# Patient Record
Sex: MALE | Race: BLACK OR AFRICAN AMERICAN | NOT HISPANIC OR LATINO | Employment: FULL TIME | URBAN - METROPOLITAN AREA
[De-identification: names, ages, dates, MRNs, and addresses within clinical notes are randomized per-mention and may not be internally consistent; named-entity substitution may affect disease eponyms.]

---

## 2017-03-10 ENCOUNTER — HOSPITAL ENCOUNTER (EMERGENCY)
Facility: HOSPITAL | Age: 27
Discharge: HOME | End: 2017-03-10
Attending: EMERGENCY MEDICINE

## 2017-03-10 VITALS
HEART RATE: 78 BPM | BODY MASS INDEX: 26.11 KG/M2 | TEMPERATURE: 98 F | HEIGHT: 75 IN | RESPIRATION RATE: 16 BRPM | DIASTOLIC BLOOD PRESSURE: 77 MMHG | WEIGHT: 210 LBS | OXYGEN SATURATION: 99 % | SYSTOLIC BLOOD PRESSURE: 136 MMHG

## 2017-03-10 DIAGNOSIS — Z20.2 EXPOSURE TO CHLAMYDIA: Primary | ICD-10-CM

## 2017-03-10 LAB
BILIRUB UR QL STRIP: NEGATIVE
CLARITY UR: CLEAR
COLOR UR: YELLOW
GLUCOSE UR QL STRIP: NEGATIVE
HGB UR QL STRIP: NEGATIVE
KETONES UR QL STRIP: NEGATIVE
LEUKOCYTE ESTERASE UR QL STRIP: NEGATIVE
NITRITE UR QL STRIP: NEGATIVE
PH UR STRIP: 6 [PH] (ref 5–8)
PROT UR QL STRIP: NEGATIVE
SP GR UR STRIP: 1.02 (ref 1–1.03)
URN SPEC COLLECT METH UR: NORMAL
UROBILINOGEN UR STRIP-ACNC: 1 EU/DL

## 2017-03-10 PROCEDURE — 81003 URINALYSIS AUTO W/O SCOPE: CPT

## 2017-03-10 PROCEDURE — 25000003 PHARM REV CODE 250: Performed by: EMERGENCY MEDICINE

## 2017-03-10 PROCEDURE — 96372 THER/PROPH/DIAG INJ SC/IM: CPT

## 2017-03-10 PROCEDURE — 63600175 PHARM REV CODE 636 W HCPCS: Performed by: EMERGENCY MEDICINE

## 2017-03-10 PROCEDURE — 99283 EMERGENCY DEPT VISIT LOW MDM: CPT | Mod: 25

## 2017-03-10 RX ORDER — ONDANSETRON 4 MG/1
8 TABLET, ORALLY DISINTEGRATING ORAL
Status: COMPLETED | OUTPATIENT
Start: 2017-03-10 | End: 2017-03-10

## 2017-03-10 RX ORDER — AZITHROMYCIN 250 MG/1
1000 TABLET, FILM COATED ORAL
Status: COMPLETED | OUTPATIENT
Start: 2017-03-10 | End: 2017-03-10

## 2017-03-10 RX ORDER — CEFTRIAXONE 250 MG/1
250 INJECTION, POWDER, FOR SOLUTION INTRAMUSCULAR; INTRAVENOUS
Status: COMPLETED | OUTPATIENT
Start: 2017-03-10 | End: 2017-03-10

## 2017-03-10 RX ADMIN — AZITHROMYCIN 1000 MG: 250 TABLET, FILM COATED ORAL at 02:03

## 2017-03-10 RX ADMIN — CEFTRIAXONE SODIUM 250 MG: 250 INJECTION, POWDER, FOR SOLUTION INTRAMUSCULAR; INTRAVENOUS at 02:03

## 2017-03-10 RX ADMIN — ONDANSETRON 8 MG: 4 TABLET, ORALLY DISINTEGRATING ORAL at 02:03

## 2017-03-10 NOTE — DISCHARGE INSTRUCTIONS
Follow up at    [facility]  [address] [address]  [phone]

## 2017-03-10 NOTE — ED TRIAGE NOTES
Pt. Reports his girlfriend was dx. With std. He denies any penile discharge but mild dysuria he noticed a couple of days ago. Denies any other symptoms. Pt. Exam is otherwise wnl..

## 2017-03-10 NOTE — ED AVS SNAPSHOT
[facility]  [address]  [place] [address]               Yazan Taylor   3/10/2017  2:22 AM   ED    Description:  Male : 1990   Department:  [facility]           Your Care was Coordinated By:     Provider Role From To    Naren Pablo MD Attending Provider 03/10/17 0231 --      Reason for Visit     Exposure to STD           Diagnoses this Visit        Comments    Exposure to chlamydia    -  Primary       ED Disposition     ED Disposition Condition Comment    Discharge             To Do List           Follow-up Information     Follow up with [facility].    Specialties:  Internal Medicine, Family Medicine    Contact information:    [address]  [place] [address]  [phone]        [facility]     [facility] -  Assistance  Registered nurses in the [facility] provide clinical advisement, health education, appointment booking, and other advisory services.  Call for this free service at [phone].             Medications           Message regarding Medications     Verify the changes and/or additions to your medication regime listed below are the same as discussed with your clinician today.  If any of these changes or additions are incorrect, please notify your healthcare provider.        These medications were administered today        Dose Freq    cefTRIAXone injection 250 mg 250 mg ED 1 Time    Sig: Inject 250 mg into the muscle ED 1 Time.    Class: Normal    Route: Intramuscular    azithromycin tablet 1,000 mg 1,000 mg ED 1 Time    Sig: Take 4 tablets (1,000 mg total) by mouth ED 1 Time.    Class: Normal    Route: Oral    ondansetron disintegrating tablet 8 mg 8 mg ED 1 Time    Sig: Take 2 tablets (8 mg total) by mouth ED 1 Time.    Class: Normal    Route: Oral           Verify that the below list of medications is an accurate representation of the medications  "you are currently taking.  If none reported, the list may be blank. If incorrect, please contact your healthcare provider. Carry this list with you in case of emergency.           Current Medications     azithromycin tablet 1,000 mg Take 4 tablets (1,000 mg total) by mouth ED 1 Time.    cefTRIAXone injection 250 mg Inject 250 mg into the muscle ED 1 Time.    ondansetron disintegrating tablet 8 mg Take 2 tablets (8 mg total) by mouth ED 1 Time.           Clinical Reference Information           Your Vitals Were     BP Pulse Temp Resp Height Weight    136/77 (BP Location: Right arm, Patient Position: Sitting) 78 98 °F (36.7 °C) (Oral) 16 6' 3" (1.905 m) 95.3 kg (210 lb)    SpO2 BMI             99% 26.25 kg/m2         Allergies as of 3/10/2017     No Known Allergies      Immunizations Administered on Date of Encounter - 3/10/2017     None      ED Micro, Lab, POCT     Start Ordered       Status Ordering Provider    03/10/17 0222 03/10/17 0221  Urinalysis  STAT      Final result       ED Imaging Orders     None        Discharge Instructions       Follow up at    [facility]  [facility]  [address] [place] [id number]  [phone]    Discharge References/Attachments     CHLAMYDIA, (ENGLISH)    CHLAMYDIA (MALE) (ENGLISH)      [facility] Sign-Up     Activating your [facility] account is as easy as 1-2-3!     1) Visit [email or web address], select Sign Up Now, enter this activation code and your date of birth, then select Next.  [email or web address]  Expires: 4/24/2017  2:44 AM      2) Create a username and password to use when you visit [facility] in the future and select a security question in case you lose your password and select Next.    3) Enter your e-mail address and click Sign Up!    Additional Information  If you have questions, please e-mail [email or web address] or call [phone] to talk to our [facility] staff. Remember, [facility] is NOT to be used for urgent needs. For " medical emergencies, dial 911.          [facility] complies with applicable Federal civil rights laws and does not discriminate on the basis of race, color, national origin, age, disability, or sex.        Language Assistance Services     ATTENTION: Language assistance services are available, free of charge. Please call [phone].      ATENCIÓN: Si habla español, tiene a hendrickson disposición servicios gratuitos de asistencia lingüística. Llame al [phone].     CHÚ Ý: N?u b?n nói Ti?ng Vi?t, có các d?ch v? h? tr? ngôn ng? mi?n phí dành cho b?n. G?i s? [phone].

## 2017-03-30 NOTE — ED PROVIDER NOTES
Encounter Date: 3/10/2017       History     Chief Complaint   Patient presents with    Exposure to STD     Patient presents to the ED with complaints of having been exposed to chlamydia by a friend and was told to get a check up. Denies any symptoms at this time.      Review of patient's allergies indicates:  No Known Allergies  HPI Comments: CHIEF COMPLAINT: previous partner has chlamydia  HISTORY OF PRESENT ILLNESS: This is a 27 yo male who presents to the ED tonight after a previous partner told him that they had chlamydia. The patient himself has no complaints. No penile discharge. No lesions. No pain. No change in urination.    REVIEW OF SYSTEMS:  Constitutional: No fever, no chills.  Eyes: No discharge. No pain.  HENT: No nasal drainage. No ear ache. No sore throat.  Cardiovascular: No chest pain, no palpitations.  Respiratory: No cough, no shortness of breath.  Gastrointestinal: No abdominal pain, no vomiting. No diarrhea.  Genitourinary: No hematuria, dysuria, urgency.  Musculoskeletal: No back pain.  Skin: No rashes, no lesions.  Neurological: No headache, no focal weakness.        The history is provided by the patient.     History reviewed. No pertinent past medical history.  History reviewed. No pertinent surgical history.  History reviewed. No pertinent family history.  Social History   Substance Use Topics    Smoking status: Current Some Day Smoker    Smokeless tobacco: Never Used    Alcohol use No     Review of Systems   All other systems reviewed and are negative.      Physical Exam   Initial Vitals   BP Pulse Resp Temp SpO2   03/10/17 0219 03/10/17 0219 03/10/17 0219 03/10/17 0219 03/10/17 0219   136/77 78 16 98 °F (36.7 °C) 99 %     Physical Exam    Nursing note and vitals reviewed.  Constitutional: He appears well-developed and well-nourished. He is not diaphoretic. No distress.   HENT:   Head: Normocephalic and atraumatic.   Nose: Nose normal.   Mouth/Throat: Oropharynx is clear and moist.    Eyes: Conjunctivae and EOM are normal. Pupils are equal, round, and reactive to light. No scleral icterus.   Neck: Normal range of motion. Neck supple.   Cardiovascular: Normal rate, regular rhythm and normal heart sounds.   No murmur heard.  Pulmonary/Chest: Breath sounds normal. No stridor. No respiratory distress. He has no wheezes.   Abdominal: Soft. Bowel sounds are normal. He exhibits no distension and no mass. There is no tenderness. There is no rebound and no guarding.   Musculoskeletal: Normal range of motion. He exhibits no tenderness.   Neurological: He is alert and oriented to person, place, and time. He has normal strength. No cranial nerve deficit.   Skin: Skin is warm and dry. No rash noted. No pallor.   Psychiatric: He has a normal mood and affect. Thought content normal.         ED Course   Procedures  Labs Reviewed   URINALYSIS             Medical Decision Making:   Differential Diagnosis:   STD, prostatitis, epididymitis, UTI   ED Management:  The pt presents after being exposed to an STD. I will treat him for GC/Chlamydia. Encouraged partner treatment, sexual abstinence during treatment phase and repeat test of cure through PCP or STD clinic.  After taking into careful account the historical factors and physical exam findings of the patient's presentation today, in conjunction with the empirical and objective data obtained on ED workup, no acute emergent medical condition has been identified. The patient appears to be low risk for an emergent medical condition and I feel it is safe and appropriate at this time for the patient to be discharged to follow-up as detailed in their discharge instructions for reevaluation and possible continued outpatient workup and management. I have discussed the specifics of the workup with the patient and the patient has verbalized understanding of the details of the workup, the diagnosis, the treatment plan, and the need for outpatient follow-up. In addition, I  have advised the patient that they can return to the ED and/or activate EMS at any time with worsening of their symptoms, change of their symptoms, or with any other medical complaint. The patient remained comfortable and stable during their visit in the ED.  Discharge and follow-up instructions discussed with the patient who expressed understanding and willingness to comply with my recommendations.     This medical record was prepared using voice dictation software. There may be phonetic errors.                       ED Course     Clinical Impression:   The encounter diagnosis was Exposure to chlamydia.          Naren Pablo MD  03/30/17 0035

## 2019-05-07 ENCOUNTER — HOSPITAL ENCOUNTER (EMERGENCY)
Facility: HOSPITAL | Age: 29
Discharge: HOME | End: 2019-05-07
Attending: EMERGENCY MEDICINE
Payer: COMMERCIAL

## 2019-05-07 VITALS
WEIGHT: 220 LBS | BODY MASS INDEX: 27.35 KG/M2 | RESPIRATION RATE: 14 BRPM | TEMPERATURE: 98 F | HEART RATE: 70 BPM | OXYGEN SATURATION: 100 % | SYSTOLIC BLOOD PRESSURE: 137 MMHG | DIASTOLIC BLOOD PRESSURE: 81 MMHG | HEIGHT: 75 IN

## 2019-05-07 DIAGNOSIS — M89.8X6 PAIN IN LEFT TIBIA: ICD-10-CM
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
## 2019-05-07 DIAGNOSIS — S81.811A LACERATION OF RIGHT LOWER LEG, INITIAL ENCOUNTER: Primary | ICD-10-CM

## 2019-05-07 PROCEDURE — 12002 RPR S/N/AX/GEN/TRNK2.6-7.5CM: CPT | Mod: RT,,, | Performed by: EMERGENCY MEDICINE

## 2019-05-07 PROCEDURE — 99284 EMERGENCY DEPT VISIT MOD MDM: CPT | Mod: 25

## 2019-05-07 PROCEDURE — 99284 PR EMERGENCY DEPT VISIT,LEVEL IV: ICD-10-PCS | Mod: 25,,, | Performed by: EMERGENCY MEDICINE

## 2019-05-07 PROCEDURE — 90715 TDAP VACCINE 7 YRS/> IM: CPT | Performed by: EMERGENCY MEDICINE

## 2019-05-07 PROCEDURE — 12001 RPR S/N/AX/GEN/TRNK 2.5CM/<: CPT | Mod: RT

## 2019-05-07 PROCEDURE — 25000003 PHARM REV CODE 250: Performed by: EMERGENCY MEDICINE

## 2019-05-07 PROCEDURE — 99284 EMERGENCY DEPT VISIT MOD MDM: CPT | Mod: 25,,, | Performed by: EMERGENCY MEDICINE

## 2019-05-07 PROCEDURE — 63600175 PHARM REV CODE 636 W HCPCS: Performed by: EMERGENCY MEDICINE

## 2019-05-07 PROCEDURE — 90471 IMMUNIZATION ADMIN: CPT | Performed by: EMERGENCY MEDICINE

## 2019-05-07 PROCEDURE — 12002 PR RESUP NPTERF WND BODY 2.6-7.5 CM: ICD-10-PCS | Mod: RT,,, | Performed by: EMERGENCY MEDICINE

## 2019-05-07 RX ORDER — LIDOCAINE HYDROCHLORIDE 10 MG/ML
5 INJECTION, SOLUTION EPIDURAL; INFILTRATION; INTRACAUDAL; PERINEURAL
Status: COMPLETED | OUTPATIENT
Start: 2019-05-07 | End: 2019-05-07

## 2019-05-07 RX ORDER — CEPHALEXIN 500 MG/1
500 CAPSULE ORAL EVERY 12 HOURS
Qty: 6 CAPSULE | Refills: 0 | Status: SHIPPED | OUTPATIENT
Start: 2019-05-07 | End: 2019-05-10

## 2019-05-07 RX ORDER — CEPHALEXIN 500 MG/1
500 CAPSULE ORAL
Status: COMPLETED | OUTPATIENT
Start: 2019-05-07 | End: 2019-05-07

## 2019-05-07 RX ORDER — OXYCODONE AND ACETAMINOPHEN 5; 325 MG/1; MG/1
2 TABLET ORAL
Status: COMPLETED | OUTPATIENT
Start: 2019-05-07 | End: 2019-05-07

## 2019-05-07 RX ADMIN — BACITRACIN ZINC AND POLYMYXIN B SULFATE: at 08:05

## 2019-05-07 RX ADMIN — OXYCODONE HYDROCHLORIDE AND ACETAMINOPHEN 2 TABLET: 5; 325 TABLET ORAL at 06:05

## 2019-05-07 RX ADMIN — CLOSTRIDIUM TETANI TOXOID ANTIGEN (FORMALDEHYDE INACTIVATED), CORYNEBACTERIUM DIPHTHERIAE TOXOID ANTIGEN (FORMALDEHYDE INACTIVATED), BORDETELLA PERTUSSIS TOXOID ANTIGEN (GLUTARALDEHYDE INACTIVATED), BORDETELLA PERTUSSIS FILAMENTOUS HEMAGGLUTININ ANTIGEN (FORMALDEHYDE INACTIVATED), BORDETELLA PERTUSSIS PERTACTIN ANTIGEN, AND BORDETELLA PERTUSSIS FIMBRIAE 2/3 ANTIGEN 0.5 ML: 5; 2; 2.5; 5; 3; 5 INJECTION, SUSPENSION INTRAMUSCULAR at 06:05

## 2019-05-07 RX ADMIN — CEPHALEXIN 500 MG: 500 CAPSULE ORAL at 07:05

## 2019-05-07 RX ADMIN — LIDOCAINE HYDROCHLORIDE 50 MG: 10 INJECTION, SOLUTION EPIDURAL; INFILTRATION; INTRACAUDAL; PERINEURAL at 07:05

## 2019-05-07 NOTE — ED PROVIDER NOTES
Encounter Date: 5/7/2019    SCRIBE #1 NOTE: I, Elba Woo, am scribing for, and in the presence of,  Dr. Ramirez. I have scribed the entire note.       History     Chief Complaint   Patient presents with    Leg Injury     1 hour PTA reports he was getting his son's sports balls down from roof and when he was climbing down off the roof, he felt something hit his R leg and felt sharp pain. Puncture wound noted to inside of R calf, bleeding controlled at this time     Time patient was seen by the provider: 6:10 AM      The patient is a 28 y.o. male with no significant past medical history, who presents to the ED with a complaint of right leg pain s/p fall 1.5 hours PTA. The patient states he was getting his son's sports ball from the roof, he then jumped down landing on his right leg. He states when he fell down he felt something sharp puncturing his right leg. He endorses right leg pain and a punture wound to the RLE with abrasions. Unknown last tetanus shot. Denies back pain, neck pain, abdominal pain, chest pain, headache, other extremity pain.    The history is provided by the patient and medical records.     Review of patient's allergies indicates:  No Known Allergies  History reviewed. No pertinent past medical history.  History reviewed. No pertinent surgical history.  History reviewed. No pertinent family history.  Social History     Tobacco Use    Smoking status: Current Some Day Smoker    Smokeless tobacco: Never Used   Substance Use Topics    Alcohol use: No    Drug use: No     Review of Systems  Constitutional:  No Fever  Eyes: No Vision Changes  ENT/Mouth: No sore throat,  Cardiovascular:  No Chest Pain  Respiratory:  No Cough, No SOB  Gastrointestinal:  No Nausea, No Vomiting, No Diarrhea, No abdo pain.  Genitourinary:  No  pain, No dysuria   Musculoskeletal:  No Arthralgias, No Back Pain, No Neck Pain, +Right leg pain  Skin:  +Lacertion to RLE  Neuro:  No Weakness, No Numbness, No Paresthesias,  No Dizziness, No Headache      Physical Exam     Initial Vitals [05/07/19 0602]   BP Pulse Resp Temp SpO2   (!) 155/107 78 18 97.7 °F (36.5 °C) 100 %      MAP       --         Physical Exam    Nursing note and vitals reviewed.    Physical Exam:  GENERAL APPEARANCE: Well developed, well nourished, in no acute distress.  HENT: Normocephalic, atraumatic    EYES: Sclerae anicteric   NECK: Supple  LUNGS: Breathing comfortably. Speaking in full sentences   NEUROLOGIC: Alert, interacting normally. No facial droop. Distally neurovascularly intact in RLE with some light sensitivity to touch  MSK: Moving all four extremities.  Skin: Warm and dry. No visible rash on exposed areas of skin. Right leg abrasion on anterior tibia. Lateral calf with skin avulsion throughout subcutaneous tissue and muscle seen, 2 cm x 2 cm x 2 cm.   Psych: Mood and affect normal.       ED Course   Lac Repair  Date/Time: 5/7/2019 7:55 AM  Performed by: Jack Ramirez MD  Authorized by: Jack Ramirez MD   Body area: lower extremity  Location details: right lower leg  Tendon involvement: none  Nerve involvement: none  Vascular damage: no    Anesthesia:  Local Anesthetic: lidocaine 1% without epinephrine  Irrigation solution: saline  Irrigation method: jet lavage  Amount of cleaning: extensive  Debridement: none  Degree of undermining: none  Skin closure: 3-0 nylon  Number of sutures: 7  Technique: simple  Approximation: close  Approximation difficulty: complex  Dressing: 4x4 sterile gauze  Patient tolerance: Patient tolerated the procedure well with no immediate complications  Comments: Triangle shapped laceration. Skin re-approximated well.         Labs Reviewed - No data to display       Imaging Results          X-Ray Ankle Complete Right (Final result)  Result time 05/07/19 06:38:40    Final result by Juan Pablo George MD (05/07/19 06:38:40)                 Impression:      There is no evidence of fracture or subluxation.      Electronically  signed by: Juan Pablo George MD  Date:    05/07/2019  Time:    06:38             Narrative:    EXAMINATION:  XR ANKLE COMPLETE 3 VIEW RIGHT    CLINICAL HISTORY:  Unspecified fall, initial encounter    TECHNIQUE:  Two view examination of theRIGHT ankle    COMPARISON:  None.    FINDINGS:  The alignment is within normal limits.  No displaced fractures identified.  No evidence of lytic or blastic lesions.Joint spaces and soft tissues are unremarkable.                               X-Ray Tibia Fibula 2 View Right (Final result)  Result time 05/07/19 06:38:03    Final result by Juan Pablo George MD (05/07/19 06:38:03)                 Impression:      Air in the soft tissues proximal medial tibial level likely represents soft tissue injury/puncture wound.  There is no evidence of fracture or subluxation.      Electronically signed by: Juan Pablo George MD  Date:    05/07/2019  Time:    06:38             Narrative:    EXAMINATION:  XR TIBIA FIBULA 2 VIEW RIGHT    CLINICAL HISTORY:  Unspecified fall, initial encounter    TECHNIQUE:  Two view examination of theRIGHT tibia-fibula    COMPARISON:  None.    FINDINGS:  The alignment is within normal limits.  No displaced fractures identified.  No evidence of lytic or blastic lesions.Air in the soft tissues likely represents soft tissue injury/puncture wound.                               X-Ray Knee 1 or 2 View Right (Final result)  Result time 05/07/19 06:36:23    Final result by Juan Pablo George MD (05/07/19 06:36:23)                 Impression:      There is no evidence of fracture or subluxation.      Electronically signed by: Juan Pablo George MD  Date:    05/07/2019  Time:    06:36             Narrative:    EXAMINATION:  XR KNEE 1 OR 2 VIEW RIGHT    CLINICAL HISTORY:  fall;    TECHNIQUE:  Two  view examination of theRIGHT knee    COMPARISON:  None.    FINDINGS:  The alignment is within normal limits.  No displaced fractures identified.  No evidence of lytic or blastic  lesions.Joint spaces and soft tissues are unremarkable.  Irregularity at level of tibial tubercle likely represents chronic Osgood Schlatter                                 Medical Decision Making:   History:   Old Medical Records: I decided to obtain old medical records.  Initial Assessment:   28 y.o. Male patient presents after a mechanical fall on the roof, now with leg pain and skin avulsion. XR done are negative for fracture. This is not consistent for open fracture. Given pain control and tetanus shot. There were no other signs of significant pathology. Wound irrigated and closed. Given the depth of the wound, will give 3 days of antibiotics and have outpatient follow up for suture removal and precautions.   BP elevated initially, likely secondary to pain, improved in the emergency department.    MDM Complexity Points:   Problem Points:  1.New problem, with no additional ED work-up planned (maximum of 1) - leg pain and laceration.  2.Self-limited or minor (maximum of 2) - elevated blood pressure    Data Points:  Review or order radiology test and Decision to obtain old records (in the EHR)      Independently Interpreted Test(s):   I have ordered and independently interpreted X-rays - see prior notes.  Clinical Tests:   Radiological Study: Ordered and Reviewed            Scribe Attestation:   Scribe #1: I performed the above scribed service and the documentation accurately describes the services I performed. I attest to the accuracy of the note.               Clinical Impression:       ICD-10-CM ICD-9-CM   1. Laceration of right lower leg, initial encounter S81.811A 891.0   2. Pain in left tibia M89.8X6 733.90   3. Fall W19.XXXA E888.9         Disposition:   Disposition: Discharged  Condition: Stable                        Jack Ramirez MD  05/07/19 6928

## 2019-05-07 NOTE — DISCHARGE INSTRUCTIONS
Follow up to have your sutures removed in 10-14 days.     If you have any redness, increased pain, puss from wound, fevers, or any other new, worsening or worrisome symptoms, please return to the emergency department for re-evaluation.     Keep the wound covered. You can put antibiotic ointment on it.     Your blood pressure was high, this could be because of pain. You should have it re-checked with your primary are doctor.     You were prescribed a short course of abx.     Follow up with your primary care doctor as needed.

## 2019-05-07 NOTE — ED TRIAGE NOTES
Yazan Taylor, a 28 y.o. male presents to the ED     Triage note:  Chief Complaint   Patient presents with    Leg Injury     1 hour PTA reports he was getting his son's sports balls down from roof and when he was climbing down off the roof, he felt something hit his R leg and felt sharp pain. Puncture wound noted to inside of R calf, bleeding controlled at this time     Review of patient's allergies indicates:  No Known Allergies  No past medical history on file.    Two patient identifiers have been checked and are correct.      Appearance: Pt awake, alert & oriented to person, place & time. Pt in no acute distress at present time.   Skin: Color consistent with ethnicity. Mucous membranes moist. Pt has puncture wound to RLE with multiple abrasions to RLE. Pulses +2 and palpable.  Musculoskeletal: Patient moving all extremities well, pt reports pain to RLE.  Respiratory: Respirations spontaneous, even, and non-labored. Airway is open and patent.  Neurologic: Eyes open spontaneously, behavior appropriate to situation, follows commands.  Cardiac: All peripheral pulses present. No Bilateral lower extremity edema. Cap refill is <3 seconds.

## 2019-05-07 NOTE — ED NOTES
Pt care assumed.   Pt waiting on MD to suture wound.  No c/o at present.  Will continue to monitor.

## 2021-01-22 ENCOUNTER — CLINICAL SUPPORT (OUTPATIENT)
Dept: URGENT CARE | Facility: CLINIC | Age: 31
End: 2021-01-22
Payer: COMMERCIAL

## 2021-01-22 DIAGNOSIS — Z20.822 ENCOUNTER FOR LABORATORY TESTING FOR COVID-19 VIRUS: Primary | ICD-10-CM

## 2021-01-22 LAB
CTP QC/QA: YES
SARS-COV-2 RDRP RESP QL NAA+PROBE: NEGATIVE

## 2021-01-22 PROCEDURE — U0002 COVID-19 LAB TEST NON-CDC: HCPCS | Mod: QW,S$GLB,, | Performed by: PHYSICIAN ASSISTANT

## 2021-01-22 PROCEDURE — U0002: ICD-10-PCS | Mod: QW,S$GLB,, | Performed by: PHYSICIAN ASSISTANT

## 2021-08-10 ENCOUNTER — CLINICAL SUPPORT (OUTPATIENT)
Dept: URGENT CARE | Facility: CLINIC | Age: 31
End: 2021-08-10
Payer: COMMERCIAL

## 2021-08-10 ENCOUNTER — TELEPHONE (OUTPATIENT)
Dept: URGENT CARE | Facility: CLINIC | Age: 31
End: 2021-08-10

## 2021-08-10 DIAGNOSIS — U07.1 COVID-19 VIRUS DETECTED: ICD-10-CM

## 2021-08-10 DIAGNOSIS — Z20.822 COVID-19 RULED OUT: Primary | ICD-10-CM

## 2021-08-10 LAB
CTP QC/QA: YES
SARS-COV-2 RDRP RESP QL NAA+PROBE: POSITIVE

## 2021-08-10 PROCEDURE — U0002: ICD-10-PCS | Mod: QW,S$GLB,, | Performed by: NURSE PRACTITIONER

## 2021-08-10 PROCEDURE — U0002 COVID-19 LAB TEST NON-CDC: HCPCS | Mod: QW,S$GLB,, | Performed by: NURSE PRACTITIONER
